# Patient Record
Sex: MALE | Race: WHITE | NOT HISPANIC OR LATINO | Employment: FULL TIME | URBAN - METROPOLITAN AREA
[De-identification: names, ages, dates, MRNs, and addresses within clinical notes are randomized per-mention and may not be internally consistent; named-entity substitution may affect disease eponyms.]

---

## 2018-06-29 ENCOUNTER — HOSPITAL ENCOUNTER (OUTPATIENT)
Facility: HOSPITAL | Age: 23
Discharge: HOME | End: 2018-06-30
Attending: FAMILY MEDICINE | Admitting: INTERNAL MEDICINE
Payer: COMMERCIAL

## 2018-06-29 DIAGNOSIS — K52.9 COLITIS: Primary | ICD-10-CM

## 2018-06-29 LAB
ALBUMIN SERPL BCP-MCNC: 4.8 G/DL
ALP SERPL-CCNC: 61 U/L
ALT SERPL W/O P-5'-P-CCNC: 15 U/L
ANION GAP SERPL CALC-SCNC: 12 MMOL/L
AST SERPL-CCNC: 18 U/L
BASOPHILS # BLD AUTO: 0.03 K/UL
BASOPHILS NFR BLD: 0.2 %
BILIRUB SERPL-MCNC: 1.3 MG/DL
BUN SERPL-MCNC: 14 MG/DL
CALCIUM SERPL-MCNC: 9.7 MG/DL
CHLORIDE SERPL-SCNC: 102 MMOL/L
CO2 SERPL-SCNC: 23 MMOL/L
CREAT SERPL-MCNC: 0.9 MG/DL
DIFFERENTIAL METHOD: ABNORMAL
EOSINOPHIL # BLD AUTO: 0.1 K/UL
EOSINOPHIL NFR BLD: 0.6 %
ERYTHROCYTE [DISTWIDTH] IN BLOOD BY AUTOMATED COUNT: 12.3 %
EST. GFR  (AFRICAN AMERICAN): >60 ML/MIN/1.73 M^2
EST. GFR  (NON AFRICAN AMERICAN): >60 ML/MIN/1.73 M^2
GLUCOSE SERPL-MCNC: 91 MG/DL
HCT VFR BLD AUTO: 46 %
HGB BLD-MCNC: 16.1 G/DL
IMM GRANULOCYTES # BLD AUTO: 0.04 K/UL
IMM GRANULOCYTES NFR BLD AUTO: 0.3 %
LIPASE SERPL-CCNC: 32 U/L
LYMPHOCYTES # BLD AUTO: 0.7 K/UL
LYMPHOCYTES NFR BLD: 5.1 %
MCH RBC QN AUTO: 29.8 PG
MCHC RBC AUTO-ENTMCNC: 35 G/DL
MCV RBC AUTO: 85 FL
MONOCYTES # BLD AUTO: 0.7 K/UL
MONOCYTES NFR BLD: 5 %
NEUTROPHILS # BLD AUTO: 12.4 K/UL
NEUTROPHILS NFR BLD: 88.8 %
NRBC BLD-RTO: 0 /100 WBC
PLATELET # BLD AUTO: 181 K/UL
PMV BLD AUTO: 12.3 FL
POTASSIUM SERPL-SCNC: 3.4 MMOL/L
PROT SERPL-MCNC: 7.7 G/DL
RBC # BLD AUTO: 5.4 M/UL
SODIUM SERPL-SCNC: 137 MMOL/L
WBC # BLD AUTO: 13.92 K/UL

## 2018-06-29 PROCEDURE — 96361 HYDRATE IV INFUSION ADD-ON: CPT

## 2018-06-29 PROCEDURE — 25000003 PHARM REV CODE 250: Performed by: FAMILY MEDICINE

## 2018-06-29 PROCEDURE — S0030 INJECTION, METRONIDAZOLE: HCPCS | Performed by: FAMILY MEDICINE

## 2018-06-29 PROCEDURE — G0378 HOSPITAL OBSERVATION PER HR: HCPCS

## 2018-06-29 PROCEDURE — 85025 COMPLETE CBC W/AUTO DIFF WBC: CPT

## 2018-06-29 PROCEDURE — 80053 COMPREHEN METABOLIC PANEL: CPT

## 2018-06-29 PROCEDURE — S5010 5% DEXTROSE AND 0.45% SALINE: HCPCS | Performed by: FAMILY MEDICINE

## 2018-06-29 PROCEDURE — 83690 ASSAY OF LIPASE: CPT

## 2018-06-29 PROCEDURE — 99285 EMERGENCY DEPT VISIT HI MDM: CPT | Mod: 25

## 2018-06-29 PROCEDURE — 25500020 PHARM REV CODE 255: Performed by: FAMILY MEDICINE

## 2018-06-29 PROCEDURE — 74177 CT ABD & PELVIS W/CONTRAST: CPT | Mod: TC

## 2018-06-29 PROCEDURE — 74177 CT ABD & PELVIS W/CONTRAST: CPT | Mod: 26,,, | Performed by: RADIOLOGY

## 2018-06-29 PROCEDURE — 96374 THER/PROPH/DIAG INJ IV PUSH: CPT

## 2018-06-29 RX ORDER — SODIUM CHLORIDE 9 MG/ML
1000 INJECTION, SOLUTION INTRAVENOUS
Status: COMPLETED | OUTPATIENT
Start: 2018-06-29 | End: 2018-06-29

## 2018-06-29 RX ORDER — ONDANSETRON 2 MG/ML
4 INJECTION INTRAMUSCULAR; INTRAVENOUS EVERY 8 HOURS PRN
Status: DISCONTINUED | OUTPATIENT
Start: 2018-06-29 | End: 2018-06-30 | Stop reason: HOSPADM

## 2018-06-29 RX ORDER — METRONIDAZOLE 500 MG/100ML
500 INJECTION, SOLUTION INTRAVENOUS
Status: DISCONTINUED | OUTPATIENT
Start: 2018-06-29 | End: 2018-06-30 | Stop reason: HOSPADM

## 2018-06-29 RX ORDER — DEXTROSE MONOHYDRATE AND SODIUM CHLORIDE 5; .45 G/100ML; G/100ML
INJECTION, SOLUTION INTRAVENOUS CONTINUOUS
Status: DISCONTINUED | OUTPATIENT
Start: 2018-06-29 | End: 2018-06-30 | Stop reason: HOSPADM

## 2018-06-29 RX ORDER — ALPRAZOLAM 0.25 MG/1
0.25 TABLET ORAL
Status: COMPLETED | OUTPATIENT
Start: 2018-06-29 | End: 2018-06-29

## 2018-06-29 RX ORDER — MORPHINE SULFATE 4 MG/ML
4 INJECTION, SOLUTION INTRAMUSCULAR; INTRAVENOUS EVERY 4 HOURS PRN
Status: DISCONTINUED | OUTPATIENT
Start: 2018-06-29 | End: 2018-06-30 | Stop reason: HOSPADM

## 2018-06-29 RX ORDER — CIPROFLOXACIN 2 MG/ML
400 INJECTION, SOLUTION INTRAVENOUS
Status: DISCONTINUED | OUTPATIENT
Start: 2018-06-29 | End: 2018-06-30 | Stop reason: HOSPADM

## 2018-06-29 RX ORDER — ACETAMINOPHEN 325 MG/1
1000 TABLET ORAL
Status: COMPLETED | OUTPATIENT
Start: 2018-06-29 | End: 2018-06-29

## 2018-06-29 RX ADMIN — IOHEXOL 75 ML: 350 INJECTION, SOLUTION INTRAVENOUS at 07:06

## 2018-06-29 RX ADMIN — METRONIDAZOLE 500 MG: 500 INJECTION, SOLUTION INTRAVENOUS at 11:06

## 2018-06-29 RX ADMIN — SODIUM CHLORIDE 1000 ML: 9 INJECTION, SOLUTION INTRAVENOUS at 05:06

## 2018-06-29 RX ADMIN — ALPRAZOLAM 0.25 MG: 0.25 TABLET ORAL at 07:06

## 2018-06-29 RX ADMIN — DEXTROSE AND SODIUM CHLORIDE: 5; 450 INJECTION, SOLUTION INTRAVENOUS at 11:06

## 2018-06-29 RX ADMIN — ACETAMINOPHEN 975 MG: 325 TABLET, FILM COATED ORAL at 05:06

## 2018-06-29 NOTE — ED PROVIDER NOTES
Encounter Date: 6/29/2018       History   No chief complaint on file.    Patient to ER for abdominal pain, generalized, nausea. Reports has had off/on abdominal pains for 3 years, worsening in 3 days. Had a bloody bowel movement day before yesterday after abdominal pains/cramping.           Review of patient's allergies indicates:  Allergies not on file  No past medical history on file.  No past surgical history on file.  No family history on file.  Social History   Substance Use Topics    Smoking status: Not on file    Smokeless tobacco: Not on file    Alcohol use Not on file     Review of Systems   Constitutional: Negative.  Negative for fever.   HENT: Negative.  Negative for sore throat.    Respiratory: Negative.  Negative for shortness of breath.    Cardiovascular: Negative.  Negative for chest pain.   Gastrointestinal: Positive for abdominal pain and nausea.   Genitourinary: Negative for dysuria.   Musculoskeletal: Negative.  Negative for back pain.   Skin: Negative.  Negative for rash.   Neurological: Negative for weakness.   Hematological: Does not bruise/bleed easily.   All other systems reviewed and are negative.      Physical Exam     Initial Vitals   BP Pulse Resp Temp SpO2   -- -- -- -- --      MAP       --         Physical Exam    Nursing note and vitals reviewed.  Constitutional: He appears well-developed and well-nourished.   Eyes: Pupils are equal, round, and reactive to light.   Abdominal: Soft. Bowel sounds are normal. He exhibits no distension. There is tenderness (epigastric).   Neurological: He is alert and oriented to person, place, and time. He has normal strength.   Psychiatric: He has a normal mood and affect. His behavior is normal. Judgment and thought content normal.         ED Course   Procedures  Labs Reviewed - No data to display       Imaging Results    None         I have discussed case with incoming ED physician Dr. Kramer who will finish care and disposition of patient.       I  have viewed a 5 digital phone picture of the BM the patient had does appear to be a large amount of dark red blood with some small amount of stool            Labs Reviewed   COMPREHENSIVE METABOLIC PANEL - Abnormal; Notable for the following:        Result Value    Potassium 3.4 (*)     Total Bilirubin 1.3 (*)     All other components within normal limits   CBC W/ AUTO DIFFERENTIAL - Abnormal; Notable for the following:     WBC 13.92 (*)     Gran # (ANC) 12.4 (*)     Lymph # 0.7 (*)     Gran% 88.8 (*)     Lymph% 5.1 (*)     All other components within normal limits   LIPASE        ED Course as of Jun 29 2120 Fri Jun 29, 2018 2112 Subtle mural thickening in the descending colon, may reflect colitis, given history of Crohn's disease.  No bowel obstruction is seen. Consider ileus.  [MD]   2114 Spoke with Dr. Young, will admit for IV abx   [MD]      ED Course User Index  [MD] Karly Ellis MD     Clinical Impression:   The encounter diagnosis was Colitis.                             Karly Ellis MD  06/29/18 2120

## 2018-06-30 VITALS
TEMPERATURE: 96 F | RESPIRATION RATE: 16 BRPM | HEART RATE: 71 BPM | SYSTOLIC BLOOD PRESSURE: 108 MMHG | HEIGHT: 61 IN | BODY MASS INDEX: 26.43 KG/M2 | WEIGHT: 140 LBS | DIASTOLIC BLOOD PRESSURE: 56 MMHG | OXYGEN SATURATION: 100 %

## 2018-06-30 LAB
ANION GAP SERPL CALC-SCNC: 5 MMOL/L
BASOPHILS # BLD AUTO: 0.03 K/UL
BASOPHILS NFR BLD: 0.4 %
BUN SERPL-MCNC: 9 MG/DL
CALCIUM SERPL-MCNC: 9.2 MG/DL
CHLORIDE SERPL-SCNC: 104 MMOL/L
CO2 SERPL-SCNC: 27 MMOL/L
CREAT SERPL-MCNC: 0.9 MG/DL
DIFFERENTIAL METHOD: ABNORMAL
EOSINOPHIL # BLD AUTO: 0.1 K/UL
EOSINOPHIL NFR BLD: 1.6 %
ERYTHROCYTE [DISTWIDTH] IN BLOOD BY AUTOMATED COUNT: 12.5 %
EST. GFR  (AFRICAN AMERICAN): >60 ML/MIN/1.73 M^2
EST. GFR  (NON AFRICAN AMERICAN): >60 ML/MIN/1.73 M^2
GLUCOSE SERPL-MCNC: 110 MG/DL
HCT VFR BLD AUTO: 42.7 %
HGB BLD-MCNC: 15.3 G/DL
IMM GRANULOCYTES # BLD AUTO: 0.01 K/UL
IMM GRANULOCYTES NFR BLD AUTO: 0.1 %
LYMPHOCYTES # BLD AUTO: 0.9 K/UL
LYMPHOCYTES NFR BLD: 11.3 %
MCH RBC QN AUTO: 30.2 PG
MCHC RBC AUTO-ENTMCNC: 35.8 G/DL
MCV RBC AUTO: 84 FL
MONOCYTES # BLD AUTO: 0.7 K/UL
MONOCYTES NFR BLD: 8.1 %
NEUTROPHILS # BLD AUTO: 6.5 K/UL
NEUTROPHILS NFR BLD: 78.5 %
NRBC BLD-RTO: 0 /100 WBC
PLATELET # BLD AUTO: 184 K/UL
PMV BLD AUTO: 12.2 FL
POTASSIUM SERPL-SCNC: 3.5 MMOL/L
RBC # BLD AUTO: 5.07 M/UL
SODIUM SERPL-SCNC: 136 MMOL/L
WBC # BLD AUTO: 8.3 K/UL

## 2018-06-30 PROCEDURE — 96375 TX/PRO/DX INJ NEW DRUG ADDON: CPT

## 2018-06-30 PROCEDURE — G0378 HOSPITAL OBSERVATION PER HR: HCPCS

## 2018-06-30 PROCEDURE — 80048 BASIC METABOLIC PNL TOTAL CA: CPT

## 2018-06-30 PROCEDURE — 25000003 PHARM REV CODE 250: Performed by: FAMILY MEDICINE

## 2018-06-30 PROCEDURE — 85025 COMPLETE CBC W/AUTO DIFF WBC: CPT

## 2018-06-30 PROCEDURE — 36415 COLL VENOUS BLD VENIPUNCTURE: CPT

## 2018-06-30 PROCEDURE — 63600175 PHARM REV CODE 636 W HCPCS: Performed by: FAMILY MEDICINE

## 2018-06-30 PROCEDURE — 96361 HYDRATE IV INFUSION ADD-ON: CPT

## 2018-06-30 PROCEDURE — S0030 INJECTION, METRONIDAZOLE: HCPCS | Performed by: FAMILY MEDICINE

## 2018-06-30 PROCEDURE — 96376 TX/PRO/DX INJ SAME DRUG ADON: CPT

## 2018-06-30 RX ORDER — CIPROFLOXACIN 500 MG/1
500 TABLET ORAL EVERY 12 HOURS
Qty: 14 TABLET | Refills: 0 | Status: SHIPPED | OUTPATIENT
Start: 2018-06-30 | End: 2020-02-29 | Stop reason: CLARIF

## 2018-06-30 RX ORDER — METRONIDAZOLE 250 MG/1
500 TABLET ORAL EVERY 8 HOURS
Qty: 21 TABLET | Refills: 0 | Status: SHIPPED | OUTPATIENT
Start: 2018-06-30 | End: 2019-03-14

## 2018-06-30 RX ADMIN — METRONIDAZOLE 500 MG: 500 INJECTION, SOLUTION INTRAVENOUS at 05:06

## 2018-06-30 RX ADMIN — CIPROFLOXACIN 400 MG: 2 INJECTION, SOLUTION INTRAVENOUS at 12:06

## 2018-06-30 NOTE — SUBJECTIVE & OBJECTIVE
Past Medical History:   Diagnosis Date    Anxiety        Past Surgical History:   Procedure Laterality Date    TONSILLECTOMY         Review of patient's allergies indicates:  No Known Allergies    No current facility-administered medications on file prior to encounter.      No current outpatient prescriptions on file prior to encounter.     Family History     None        Social History Main Topics    Smoking status: Never Smoker    Smokeless tobacco: Not on file    Alcohol use 4.2 oz/week     7 Cans of beer per week    Drug use: Unknown    Sexual activity: Not on file     Review of Systems   Constitutional: Positive for appetite change. Negative for activity change, chills, diaphoresis, fatigue, fever and unexpected weight change.   HENT: Negative for congestion, drooling, hearing loss and trouble swallowing.    Eyes: Negative for pain and visual disturbance.   Respiratory: Negative.  Negative for apnea, cough, choking, chest tightness, shortness of breath and wheezing.    Cardiovascular: Negative for chest pain, palpitations and leg swelling.   Gastrointestinal: Positive for abdominal pain, blood in stool and nausea. Negative for abdominal distention, constipation, diarrhea and vomiting.   Endocrine: Negative for polydipsia, polyphagia and polyuria.   Genitourinary: Negative for difficulty urinating, dysuria and flank pain.   Musculoskeletal: Negative for arthralgias, back pain, gait problem, joint swelling, neck pain and neck stiffness.   Skin: Negative for color change, rash and wound.   Allergic/Immunologic: Negative for environmental allergies, food allergies and immunocompromised state.   Neurological: Negative for dizziness, syncope, weakness, numbness and headaches.   Hematological: Negative for adenopathy.   Psychiatric/Behavioral: Negative for agitation, confusion and sleep disturbance. The patient is not nervous/anxious.      Objective:     Vital Signs (Most Recent):  Temp: 96.1 °F (35.6 °C)  (06/30/18 0715)  Pulse: 71 (06/30/18 0715)  Resp: 16 (06/30/18 0715)  BP: (!) 108/56 (06/30/18 0715)  SpO2: 100 % (06/30/18 0715) Vital Signs (24h Range):  Temp:  [96.1 °F (35.6 °C)-101.3 °F (38.5 °C)] 96.1 °F (35.6 °C)  Pulse:  [70-94] 71  Resp:  [16-18] 16  SpO2:  [97 %-100 %] 100 %  BP: (100-131)/(46-82) 108/56     Weight: 63.5 kg (139 lb 15.9 oz)  Body mass index is 26.45 kg/m².    Physical Exam   Constitutional: He is oriented to person, place, and time. He appears well-developed and well-nourished.   HENT:   Head: Normocephalic and atraumatic.   Right Ear: External ear normal.   Left Ear: External ear normal.   Nose: Nose normal.   Eyes: Conjunctivae, EOM and lids are normal. Pupils are equal, round, and reactive to light.   Neck: Trachea normal, normal range of motion and full passive range of motion without pain. Neck supple.   Cardiovascular: Normal rate, regular rhythm, S1 normal, S2 normal, normal heart sounds, intact distal pulses and normal pulses.    Pulmonary/Chest: Effort normal and breath sounds normal.   Abdominal: Soft. Normal aorta and bowel sounds are normal. There is tenderness.   Musculoskeletal: Normal range of motion.   Neurological: He is alert and oriented to person, place, and time. He has normal reflexes.   Skin: Skin is warm, dry and intact.   Psychiatric: He has a normal mood and affect. His speech is normal and behavior is normal. Judgment and thought content normal. Cognition and memory are normal.   Nursing note and vitals reviewed.        CRANIAL NERVES     CN III, IV, VI   Pupils are equal, round, and reactive to light.  Extraocular motions are normal.        Significant Labs:   CBC:   Recent Labs  Lab 06/29/18  1725 06/30/18  0440   WBC 13.92* 8.30   HGB 16.1 15.3   HCT 46.0 42.7    184     CMP:   Recent Labs  Lab 06/29/18  1725 06/30/18  0440    136   K 3.4* 3.5    104   CO2 23 27   GLU 91 110   BUN 14 9   CREATININE 0.9 0.9   CALCIUM 9.7 9.2   PROT 7.7  --     ALBUMIN 4.8  --    BILITOT 1.3*  --    ALKPHOS 61  --    AST 18  --    ALT 15  --    ANIONGAP 12 5*   EGFRNONAA >60.0 >60.0     All pertinent labs within the past 24 hours have been reviewed.    Significant Imaging: CT: I have reviewed all pertinent results/findings within the past 24 hours and my personal findings are:  as dictated  I have reviewed and interpreted all pertinent imaging results/findings within the past 24 hours.

## 2018-06-30 NOTE — H&P
[facility]  History & Physical    Patient Name: Toni Hernandez  MRN: [id number]  Admission Date: 6/29/2018  Attending Physician: Andry Young MD   Primary Care Provider: Provider [employer]         Patient information was obtained from patient and ER records.     Subjective:     Principal Problem:<principal problem not specified>    Chief Complaint:   Chief Complaint   Patient presents with    Abdominal Pain    Rectal Bleeding        HPI: abd pain nausea 3 days bloody bms    Past Medical History:   Diagnosis Date    Anxiety        Past Surgical History:   Procedure Laterality Date    TONSILLECTOMY         Review of patient's allergies indicates:  No Known Allergies    No current facility-administered medications on file prior to encounter.      No current outpatient prescriptions on file prior to encounter.     Family History     None        Social History Main Topics    Smoking status: Never Smoker    Smokeless tobacco: Not on file    Alcohol use 4.2 oz/week     7 Cans of beer per week    Drug use: Unknown    Sexual activity: Not on file     Review of Systems   Constitutional: Positive for appetite change. Negative for activity change, chills, diaphoresis, fatigue, fever and unexpected weight change.   HENT: Negative for congestion, drooling, hearing loss and trouble swallowing.    Eyes: Negative for pain and visual disturbance.   Respiratory: Negative.  Negative for apnea, cough, choking, chest tightness, shortness of breath and wheezing.    Cardiovascular: Negative for chest pain, palpitations and leg swelling.   Gastrointestinal: Positive for abdominal pain, blood in stool and nausea. Negative for abdominal distention, constipation, diarrhea and vomiting.   Endocrine: Negative for polydipsia, polyphagia and polyuria.   Genitourinary: Negative for difficulty urinating, dysuria and flank pain.   Musculoskeletal: Negative for arthralgias,  back pain, gait problem, joint swelling, neck pain and neck stiffness.   Skin: Negative for color change, rash and wound.   Allergic/Immunologic: Negative for environmental allergies, food allergies and immunocompromised state.   Neurological: Negative for dizziness, syncope, weakness, numbness and headaches.   Hematological: Negative for adenopathy.   Psychiatric/Behavioral: Negative for agitation, confusion and sleep disturbance. The patient is not nervous/anxious.      Objective:     Vital Signs (Most Recent):  Temp: 96.1 °F (35.6 °C) (06/30/18 0715)  Pulse: 71 (06/30/18 0715)  Resp: 16 (06/30/18 0715)  BP: (!) 108/56 (06/30/18 0715)  SpO2: 100 % (06/30/18 0715) Vital Signs (24h Range):  Temp:  [96.1 °F (35.6 °C)-101.3 °F (38.5 °C)] 96.1 °F (35.6 °C)  Pulse:  [70-94] 71  Resp:  [16-18] 16  SpO2:  [97 %-100 %] 100 %  BP: (100-131)/(46-82) 108/56     Weight: 63.5 kg (139 lb 15.9 oz)  Body mass index is 26.45 kg/m².    Physical Exam   Constitutional: He is oriented to person, place, and time. He appears well-developed and well-nourished.   HENT:   Head: Normocephalic and atraumatic.   Right Ear: External ear normal.   Left Ear: External ear normal.   Nose: Nose normal.   Eyes: Conjunctivae, EOM and lids are normal. Pupils are equal, round, and reactive to light.   Neck: Trachea normal, normal range of motion and full passive range of motion without pain. Neck supple.   Cardiovascular: Normal rate, regular rhythm, S1 normal, S2 normal, normal heart sounds, intact distal pulses and normal pulses.    Pulmonary/Chest: Effort normal and breath sounds normal.   Abdominal: Soft. Normal aorta and bowel sounds are normal. There is tenderness.   Musculoskeletal: Normal range of motion.   Neurological: He is alert and oriented to person, place, and time. He has normal reflexes.   Skin: Skin is warm, dry and intact.   Psychiatric: He has a normal mood and affect. His speech is normal and behavior is normal. Judgment and thought  content normal. Cognition and memory are normal.   Nursing note and vitals reviewed.        CRANIAL NERVES     CN III, IV, VI   Pupils are equal, round, and reactive to light.  Extraocular motions are normal.        Significant Labs:   CBC:   Recent Labs  Lab 06/29/18  1725 06/30/18  0440   WBC 13.92* 8.30   HGB 16.1 15.3   HCT 46.0 42.7    184     CMP:   Recent Labs  Lab 06/29/18  1725 06/30/18  0440    136   K 3.4* 3.5    104   CO2 23 27   GLU 91 110   BUN 14 9   CREATININE 0.9 0.9   CALCIUM 9.7 9.2   PROT 7.7  --    ALBUMIN 4.8  --    BILITOT 1.3*  --    ALKPHOS 61  --    AST 18  --    ALT 15  --    ANIONGAP 12 5*   EGFRNONAA >60.0 >60.0     All pertinent labs within the past 24 hours have been reviewed.    Significant Imaging: CT: I have reviewed all pertinent results/findings within the past 24 hours and my personal findings are:  as dictated  I have reviewed and interpreted all pertinent imaging results/findings within the past 24 hours.    Assessment/Plan:     Colitis    clolitis abd pain ivfs antibiotics pain meds            VTE Risk Mitigation         Ordered     IP VTE LOW RISK PATIENT  Once      06/29/18 1971         Patient first seen 6/30      Andry Young MD  Department of Hospital Medicine   [facility] - Med Surg

## 2018-06-30 NOTE — PLAN OF CARE
Problem: Pain, Acute (Adult)  Goal: Identify Related Risk Factors and Signs and Symptoms  Related risk factors and signs and symptoms are identified upon initiation of Human Response Clinical Practice Guideline (CPG)   06/29/18 2247   Pain, Acute   Related Risk Factors (Acute Pain) patient perception;positioning;psychosocial factor

## 2018-06-30 NOTE — NURSING
PT D/C TO HOME PER MD ORDER, IV SITE REMOVED WITH CATH INTACT, GAUZE AND TAPE APPLIED, PT HAROLDO WELL. D/C INSTRUCTIONS REVIEWED, VERB UNDERSTOOD. PT REFUSED W/C TO AMBULATE OUT TO POV. SSRN

## 2018-06-30 NOTE — NURSING
Called ER and attempted to receive report from ER nurse but he was not available. Message left with Jaci ANGUIANO to have nurse return call when available.

## 2018-06-30 NOTE — PLAN OF CARE
Problem: Health Knowledge, Opportunity to Enhance (Adult,NICU,[place],Obstetrics,Pediatric)  Goal: Knowledgeable about Health Subject/Topic  Patient will demonstrate the desired outcomes by discharge/transition of care.  Outcome: Ongoing (interventions implemented as appropriate)   18 0684   Health Knowledge, Opportunity to Enhance (Adult,NICU,[place],Obstetrics,Pediatric)   Knowledgeable about Health Subject/Topic making progress toward outcome

## 2018-06-30 NOTE — PLAN OF CARE
Problem: Pain, Acute (Adult)  Goal: Acceptable Pain Control/Comfort Level  Patient will demonstrate the desired outcomes by discharge/transition of care.  Outcome: Ongoing (interventions implemented as appropriate)   06/29/18 6850   Pain, Acute (Adult)   Acceptable Pain Control/Comfort Level making progress toward outcome

## 2018-06-30 NOTE — HOSPITAL COURSE
IVFs antibiotics will need gi w/u as outpt  6/30 wants to go home feel much better f/u with DR REYES for c-scope with grandmother Chron's

## 2018-06-30 NOTE — DISCHARGE SUMMARY
[facility]  Hospital Medicine  Discharge Summary      Patient Name: Toni Hernandez  MRN: [id number]  Admission Date: 6/29/2018  Hospital Length of Stay: 0 days  Discharge Date and Time:  06/30/2018 9:25 AM  Attending Physician: Andry Young MD   Discharging Provider: Andry Young MD  Primary Care Provider: Provider [employer]      HPI:   abd pain nausea 3 days bloody bms    * No surgery found *      Hospital Course:   IVFs antibiotics will need gi w/u as outpt  6/30 wants to go home feel much better f/u with DR REYES for c-scope with grandmother Chron's      Consults:     No new Assessment & Plan notes have been filed under this hospital service since the last note was generated.  Service: Hospital Medicine    Final Active Diagnoses:    Diagnosis Date Noted POA    PRINCIPAL PROBLEM:  Colitis [K52.9] 06/29/2018 Yes      Problems Resolved During this Admission:    Diagnosis Date Noted Date Resolved POA       Discharged Condition: good    Disposition: Home or Self Care    Follow Up:  Follow-up Information     Yakov Greene MD.    Specialty:  General Surgery  Contact information:  [address] [place] [id number] [phone]                 Patient Instructions:   No discharge procedures on file.    Significant Diagnostic Studies: Labs:   BMP:   Recent Labs  Lab 06/29/18  1725 06/30/18  0440   GLU 91 110    136   K 3.4* 3.5    104   CO2 23 27   BUN 14 9   CREATININE 0.9 0.9   CALCIUM 9.7 9.2   , CMP   Recent Labs  Lab 06/29/18  1725 06/30/18  0440    136   K 3.4* 3.5    104   CO2 23 27   GLU 91 110   BUN 14 9   CREATININE 0.9 0.9   CALCIUM 9.7 9.2   PROT 7.7  --    ALBUMIN 4.8  --    BILITOT 1.3*  --    ALKPHOS 61  --    AST 18  --    ALT 15  --    ANIONGAP 12 5*   ESTGFRAFRICA >60.0 >60.0   EGFRNONAA >60.0 >60.0   , CBC   Recent Labs  Lab 06/29/18  1725 06/30/18  0440   WBC 13.92* 8.30   HGB 16.1 15.3   HCT 46.0 42.7   PLT  181 184    and All labs within the past 24 hours have been reviewed    Pending Diagnostic Studies:     Procedure Component Value Units Date/Time    CT Abdomen Pelvis With Contrast [[id number]] Resulted:  06/29/18 1932    Order Status:  Sent Lab Status:  In process Updated:  06/29/18 1958         Medications:  Reconciled Home Medications:      Medication List      START taking these medications    ciprofloxacin HCl 500 MG tablet  Commonly known as:  CIPRO  Take 1 tablet (500 mg total) by mouth every 12 (twelve) hours.     metroNIDAZOLE 250 MG tablet  Commonly known as:  FLAGYL  Take 2 tablets (500 mg total) by mouth every 8 (eight) hours.            Indwelling Lines/Drains at time of discharge:   Lines/Drains/Airways          No matching active lines, drains, or airways          Time spent on the discharge of patient: 35 minutes  Patient was seen and examined on the date of discharge and determined to be suitable for discharge.         Andry Young MD  Department of Hospital Medicine  [facility] - Med Surg

## 2018-07-03 NOTE — PLAN OF CARE
CM called patient to advise of Dr Greene follow up appt  On 6/12/18 0800. Left voice mail message requesting return call

## 2018-09-24 NOTE — HISTORY AND PHYSICAL ADDENDUM
In the history and physical, there is a question about location and  laterality of abdominal pain.  The patient came in with colitis.  He had  generalized abdominal pain.  There was no distinct location.  He was tender  over the generalized abdomen, particularly over the area of the colon from  right to left.        NAISM/KORI dd: 09/23/2018 06:43:24 (CDT)   td: 09/23/2018 09:57:51 (CDT)  Doc ID #[id number]   Job ID #[id number]    CC:

## 2019-03-14 ENCOUNTER — HOSPITAL ENCOUNTER (EMERGENCY)
Facility: HOSPITAL | Age: 24
Discharge: HOME | End: 2019-03-14
Attending: EMERGENCY MEDICINE
Payer: COMMERCIAL

## 2019-03-14 VITALS
DIASTOLIC BLOOD PRESSURE: 80 MMHG | TEMPERATURE: 99 F | SYSTOLIC BLOOD PRESSURE: 138 MMHG | HEART RATE: 71 BPM | HEIGHT: 69 IN | OXYGEN SATURATION: 99 % | RESPIRATION RATE: 16 BRPM | BODY MASS INDEX: 20.14 KG/M2 | WEIGHT: 136 LBS

## 2019-03-14 DIAGNOSIS — T14.90XA TRAUMA: ICD-10-CM

## 2019-03-14 DIAGNOSIS — M79.672 ACUTE PAIN OF LEFT FOOT: Primary | ICD-10-CM

## 2019-03-14 PROCEDURE — 73630 XR FOOT COMPLETE 3 VIEW LEFT: ICD-10-PCS | Mod: 26,LT,, | Performed by: RADIOLOGY

## 2019-03-14 PROCEDURE — 73630 X-RAY EXAM OF FOOT: CPT | Mod: TC,FY,LT

## 2019-03-14 PROCEDURE — 99283 EMERGENCY DEPT VISIT LOW MDM: CPT

## 2019-03-14 PROCEDURE — 73630 X-RAY EXAM OF FOOT: CPT | Mod: 26,LT,, | Performed by: RADIOLOGY

## 2019-03-15 NOTE — DISCHARGE INSTRUCTIONS
Rest, ice and elevate  Alternate Tylenol or Ibuprofen as directed for pain  Follow up with Ortho  Return to Emergency Department for any worsening symptoms

## 2019-03-15 NOTE — ED PROVIDER NOTES
Encounter Date: 3/14/2019       History     Chief Complaint   Patient presents with    Foot Pain     L FOOT PAIN AFTER RAN OVER WITH EQUIPMENT     Pt c/o of left foot pain.  Pt states a heavy object fell on his left foot earlier today.          Review of patient's allergies indicates:   Allergen Reactions    Sulfa (sulfonamide antibiotics)      Past Medical History:   Diagnosis Date    Anxiety      Past Surgical History:   Procedure Laterality Date    TONSILLECTOMY       No family history on file.  Social History     Tobacco Use    Smoking status: Never Smoker    Smokeless tobacco: Never Used   Substance Use Topics    Alcohol use: Yes     Alcohol/week: 4.2 oz     Types: 7 Cans of beer per week     Comment: 3-4 DAYS WEEKLY    Drug use: No     Review of Systems   Musculoskeletal:        Left foot pain   All other systems reviewed and are negative.      Physical Exam     Initial Vitals [03/14/19 2007]   BP Pulse Resp Temp SpO2   138/80 71 16 98.9 °F (37.2 °C) 99 %      MAP       --         Physical Exam    Nursing note and vitals reviewed.  Constitutional: He appears well-developed and well-nourished.   HENT:   Head: Normocephalic.   Nose: Nose normal.   Eyes: Conjunctivae and EOM are normal. Pupils are equal, round, and reactive to light.   Neck: Normal range of motion. Neck supple.   Cardiovascular: Normal rate.   Pulmonary/Chest: Breath sounds normal.   Abdominal: Soft. Bowel sounds are normal.   Musculoskeletal:        Left ankle: He exhibits normal range of motion, no swelling, no ecchymosis and no deformity. Tenderness.   Neurological: He is alert and oriented to person, place, and time. He has normal strength and normal reflexes.   Skin: Skin is warm and dry. Capillary refill takes 2 to 3 seconds.   Psychiatric: He has a normal mood and affect. His behavior is normal. Judgment and thought content normal.         ED Course   Procedures  Labs Reviewed - No data to display       Imaging Results           X-Ray Foot Complete Left (In process)                                       Clinical Impression:       ICD-10-CM ICD-9-CM   1. Acute pain of left foot M79.672 729.5   2. Trauma T14.90XA 959.9                                Rosalind Hagen, [facility]  03/14/19 2054

## 2020-02-16 ENCOUNTER — HOSPITAL ENCOUNTER (EMERGENCY)
Facility: HOSPITAL | Age: 25
Discharge: HOME | End: 2020-02-16
Attending: EMERGENCY MEDICINE
Payer: COMMERCIAL

## 2020-02-16 VITALS
DIASTOLIC BLOOD PRESSURE: 58 MMHG | HEART RATE: 88 BPM | HEIGHT: 68 IN | SYSTOLIC BLOOD PRESSURE: 128 MMHG | WEIGHT: 160 LBS | TEMPERATURE: 99 F | RESPIRATION RATE: 16 BRPM | BODY MASS INDEX: 24.25 KG/M2 | OXYGEN SATURATION: 98 %

## 2020-02-16 DIAGNOSIS — M25.562 LEFT KNEE PAIN: ICD-10-CM

## 2020-02-16 DIAGNOSIS — S83.412A SPRAIN OF MEDIAL COLLATERAL LIGAMENT OF LEFT KNEE, INITIAL ENCOUNTER: Primary | ICD-10-CM

## 2020-02-16 PROCEDURE — 99283 EMERGENCY DEPT VISIT LOW MDM: CPT | Mod: 25

## 2020-02-16 RX ORDER — IBUPROFEN 200 MG
400 TABLET ORAL EVERY 6 HOURS PRN
Qty: 30 TABLET | Refills: 0
Start: 2020-02-16

## 2020-02-16 RX ORDER — ACETAMINOPHEN 500 MG
1000 TABLET ORAL 3 TIMES DAILY PRN
Qty: 30 TABLET | Refills: 0
Start: 2020-02-16

## 2020-02-16 NOTE — ED PROVIDER NOTES
Encounter Date: 2/16/2020       History     Chief Complaint   Patient presents with    Knee Pain     left knee pain since yesterday     24-year-old male with a slip and fall at home yesterday afternoon.  Patient stepped into a basket or item in his living room which slipped or slid on the wood floor causing him to fall.  Developed pain in his left knee.  Has a left knee effusion.  Is able to ambulate or bear weight however he does complain of pain with range of motion.        Review of patient's allergies indicates:   Allergen Reactions    Sulfa (sulfonamide antibiotics)      Past Medical History:   Diagnosis Date    Anxiety      Past Surgical History:   Procedure Laterality Date    TONSILLECTOMY       History reviewed. No pertinent family history.  Social History     Tobacco Use    Smoking status: Never Smoker    Smokeless tobacco: Never Used   Substance Use Topics    Alcohol use: Yes     Alcohol/week: 7.0 standard drinks     Types: 7 Cans of beer per week     Comment: 3-4 DAYS WEEKLY    Drug use: No     Review of Systems   Constitutional: Negative for fever.   HENT: Negative for sore throat.    Eyes: Negative for redness.   Respiratory: Negative for shortness of breath.    Cardiovascular: Negative for chest pain.   Gastrointestinal: Negative for nausea.   Genitourinary: Negative for dysuria.   Musculoskeletal: Positive for arthralgias, gait problem and joint swelling. Negative for back pain.   Skin: Negative for rash and wound.   Neurological: Negative for weakness.   Hematological: Does not bruise/bleed easily.   Psychiatric/Behavioral: Negative for behavioral problems. The patient is not nervous/anxious.    All other systems reviewed and are negative.      Physical Exam     Initial Vitals [02/16/20 1148]   BP Pulse Resp Temp SpO2   (!) 152/74 87 16 98.1 °F (36.7 °C) 99 %      MAP       --         Physical Exam    Nursing note and vitals reviewed.  Constitutional: He appears well-developed and  well-nourished.   HENT:   Head: Normocephalic and atraumatic.   Eyes: Conjunctivae, EOM and lids are normal.   Neck: Normal range of motion and full passive range of motion without pain.   Musculoskeletal:        Left knee: He exhibits decreased range of motion, swelling and effusion. He exhibits no deformity. Tenderness found. Medial joint line tenderness noted.   Neurological: He is alert.   Skin: Skin is warm, dry and intact.   Psychiatric: He has a normal mood and affect. His speech is normal and behavior is normal.         ED Course   Procedures  Labs Reviewed - No data to display       Imaging Results          X-Ray Knee Complete 4 or More Views Left (Preliminary result)  Result time 02/16/20 12:33:02    ED Interpretation by RADHA Crowe (02/16/20 12:33:02, [facility], Emergency Medicine)    Effusion, no acute osseous abnormality                               Medical Decision Making:   History:   I obtained history from: someone other than patient.       <> Summary of History: History was obtained from the patient and/or the patient's immediate family member present.  Initial Assessment:   NAD  Differential Diagnosis:   The patient's differential diagnoses includes but is not limited to closed fracture, sprain/strain, fall, musculoskeletal pain, contusion  Clinical Tests:   Radiological Study: Ordered                                 Clinical Impression:       ICD-10-CM ICD-9-CM   1. Sprain of medial collateral ligament of left knee, initial encounter S83.412A 844.1   2. Left knee pain M25.562 719.46                             RADHA Crowe  02/16/20 1233

## 2020-02-29 ENCOUNTER — HOSPITAL ENCOUNTER (EMERGENCY)
Facility: HOSPITAL | Age: 25
Discharge: HOME | End: 2020-02-29
Attending: EMERGENCY MEDICINE
Payer: COMMERCIAL

## 2020-02-29 VITALS
WEIGHT: 162 LBS | OXYGEN SATURATION: 99 % | HEIGHT: 68 IN | SYSTOLIC BLOOD PRESSURE: 124 MMHG | DIASTOLIC BLOOD PRESSURE: 93 MMHG | HEART RATE: 92 BPM | BODY MASS INDEX: 24.55 KG/M2 | RESPIRATION RATE: 20 BRPM | TEMPERATURE: 98 F

## 2020-02-29 DIAGNOSIS — M25.562 ACUTE PAIN OF LEFT KNEE: Primary | ICD-10-CM

## 2020-02-29 PROCEDURE — 99283 EMERGENCY DEPT VISIT LOW MDM: CPT

## 2020-02-29 RX ORDER — HYDROCODONE BITARTRATE AND ACETAMINOPHEN 5; 325 MG/1; MG/1
1 TABLET ORAL EVERY 8 HOURS PRN
Qty: 6 TABLET | Refills: 0 | Status: SHIPPED | OUTPATIENT
Start: 2020-02-29

## 2020-02-29 NOTE — ED TRIAGE NOTES
Patient injured his left knee on 2/16, seen in [place] on 2/16, has not followed up with Ortho, was told by [facility] he needed a MRI.

## 2020-03-01 NOTE — ED PROVIDER NOTES
CHIEF COMPLAINT  Chief Complaint   Patient presents with    Knee Pain     Patient injured his left knee on 2/16, seen in [place] on 2/16, has not followed up with Ortho, was told by [facility] he needed a MRI.       HPI  Toni Rossi a 24 y.o. male who presents to the ED with complaints of left knee pain.  slipped and fell, twisting knee.  was seen in ED at [place] and told he needs a MRI. Has not been able to have done.  originally got better but for past 2 days, has been unable to bear weight on knee      CURRENT MEDICATIONS  No current facility-administered medications on file prior to encounter.      Current Outpatient Medications on File Prior to Encounter   Medication Sig Dispense Refill    acetaminophen (TYLENOL) 500 MG tablet Take 2 tablets (1,000 mg total) by mouth 3 (three) times daily as needed for Pain. 30 tablet 0    ibuprofen (ADVIL,MOTRIN) 200 MG tablet Take 2 tablets (400 mg total) by mouth every 6 (six) hours as needed for Pain. 30 tablet 0    [DISCONTINUED] ciprofloxacin HCl (CIPRO) 500 MG tablet Take 1 tablet (500 mg total) by mouth every 12 (twelve) hours. 14 tablet 0       ALLERGIES  Review of patient's allergies indicates:   Allergen Reactions    Sulfa (sulfonamide antibiotics)          There is no immunization history on file for this patient.    PAST MEDICAL HISTORY  Past Medical History:   Diagnosis Date    Anxiety        SURGICAL HISTORY  Past Surgical History:   Procedure Laterality Date    TONSILLECTOMY         SOCIAL HISTORY  Social History     Socioeconomic History    Marital status:      Spouse name: Not on file    Number of children: Not on file    Years of education: Not on file    Highest education level: Not on file   Occupational History    Not on file   Social Needs    Financial resource strain: Not on file    Food insecurity:     Worry: Not on file     Inability: Not on file    Transportation needs:     Medical: Not on file     " Non-medical: Not on file   Tobacco Use    Smoking status: Never Smoker    Smokeless tobacco: Never Used   Substance and Sexual Activity    Alcohol use: Yes     Alcohol/week: 7.0 standard drinks     Types: 7 Cans of beer per week     Comment: 3-4 DAYS WEEKLY    Drug use: No    Sexual activity: Yes   Lifestyle    Physical activity:     Days per week: Not on file     Minutes per session: Not on file    Stress: Not on file   Relationships    Social connections:     Talks on phone: Not on file     Gets together: Not on file     Attends Gnosticist service: Not on file     Active member of club or organization: Not on file     Attends meetings of clubs or organizations: Not on file     Relationship status: Not on file   Other Topics Concern    Not on file   Social History Narrative    Not on file       FAMILY HISTORY  History reviewed. No pertinent family history.    REVIEW OF SYSTEMS  Constitutional: No fever, chills, or weakness.  Eyes: No redness, pain, or discharge  HENT: No ear pain, no headache, no rhinorrhea, no throat pain  Respiratory: No cough, wheezing or shortness of breath  Cardiovascular: No chest pain, palpitations or edema  GI: No abdominal pain, nausea, vomiting or diarrhea  Gu: No dysuria, no hematuria, or discharge  Musculoskeletal: + left knee pain, full range of motion. Good sensation  Skin: No rash or abrasion  Neurologic: No focal weakness or sensory changes.  All systems otherwise negative except as noted in the Review of Systems and History of Present Illness      PHYSICAL EXAM  Reviewed Triage Note  VITAL SIGNS:   Patient Vitals for the past 24 hrs:   BP Temp Temp src Pulse Resp SpO2 Height Weight   02/29/20 1753 (!) 124/93 98.3 °F (36.8 °C) Oral 92 20 99 % 5' 8" (1.727 m) 73.5 kg (162 lb)     Constitutional: Well developed, well nourished, Alert and oriented x3, No acute distress, non-toxic appearance.  Respiratory: Normal breath sounds, no respiratory distress, no wheezing, no rhonchi, " no rales  Cardiovascular: Normal heart rate, normal rhythm, no murmurs, no rubs, no gallops.  Musculoskeletal: Ambulatory with slight limp. Knee with slight swelling, Joint stable, no laxity, no redness or warmth  Integument: Warm, Dry, No erythema, no rash  Neurologic: Normal motor function, normal sensory function. No focal deficits noted. Intact distal pulses  Psychiatric: Affect normal, judgment normal, mood normal      LABS  Pertinent labs reviewed. (see chart for details)  Labs Reviewed - No data to display    RADIOLOGY  No orders to display         PROCEDURE  Procedures      ED COURSE & MEDICAL DECISION MAKING     MDM       Physical exam findings discussed with patient. No acute emergent medical condition identified at this time to warrant further testing. Will dispo home with instructions to follow up with PCP, return to the ED for worsening condition. Pt agrees with plan of care.     DISPOSITION  Patient discharged in stable condition 2/29/2020  6:37 PM      CLINICAL IMPRESSION:  The encounter diagnosis was Acute pain of left knee.    Patient advised to follow-up with your PCP within 3 days for BP re-check if Blood Pressure was >120/80 without history of hypertension.         Mariangel Fonseca, CYNTHIA  02/29/20 3262

## 2020-03-02 ENCOUNTER — TELEPHONE (OUTPATIENT)
Dept: ORTHOPEDICS | Facility: CLINIC | Age: 25
End: 2020-03-02

## 2022-03-27 ENCOUNTER — HOSPITAL ENCOUNTER (EMERGENCY)
Facility: HOSPITAL | Age: 27
Discharge: HOME | End: 2022-03-27
Attending: EMERGENCY MEDICINE
Payer: COMMERCIAL

## 2022-03-27 VITALS
HEIGHT: 70 IN | TEMPERATURE: 98 F | DIASTOLIC BLOOD PRESSURE: 86 MMHG | WEIGHT: 145 LBS | OXYGEN SATURATION: 98 % | RESPIRATION RATE: 20 BRPM | BODY MASS INDEX: 20.76 KG/M2 | HEART RATE: 100 BPM | SYSTOLIC BLOOD PRESSURE: 139 MMHG

## 2022-03-27 DIAGNOSIS — R19.01 ABDOMINAL MASS, RUQ (RIGHT UPPER QUADRANT): Primary | ICD-10-CM

## 2022-03-27 DIAGNOSIS — R10.11 RUQ PAIN: ICD-10-CM

## 2022-03-27 PROCEDURE — 74176 CT ABD & PELVIS W/O CONTRAST: CPT | Mod: 26,,, | Performed by: RADIOLOGY

## 2022-03-27 PROCEDURE — 74176 CT ABDOMEN PELVIS WITHOUT CONTRAST: ICD-10-PCS | Mod: 26,,, | Performed by: RADIOLOGY

## 2022-03-27 PROCEDURE — 74176 CT ABD & PELVIS W/O CONTRAST: CPT | Mod: TC

## 2022-03-27 PROCEDURE — 99284 EMERGENCY DEPT VISIT MOD MDM: CPT | Mod: 25

## 2022-03-27 NOTE — ED PROVIDER NOTES
Encounter Date: 3/27/2022       History     Chief Complaint   Patient presents with    Feels an abdominal mass in abdomen x 2-3 weeks      Patient has a friend that is a nurse and they told him to go to the ER      The history is provided by the patient.   General Illness   The current episode started several weeks ago (right upper quarant, feeling a mass). The problem occurs continuously. The problem has been unchanged. The pain is at a severity of 0/10. Nothing relieves the symptoms. Nothing aggravates the symptoms. Pertinent negatives include no fever, no abdominal pain, no constipation, no diarrhea, no nausea, no vomiting, no sore throat, no muscle aches and no neck pain.     Review of patient's allergies indicates:   Allergen Reactions    Sulfa (sulfonamide antibiotics)      Past Medical History:   Diagnosis Date    Anxiety      Past Surgical History:   Procedure Laterality Date    TONSILLECTOMY       History reviewed. No pertinent family history.  Social History     Tobacco Use    Smoking status: Never Smoker    Smokeless tobacco: Never Used   Substance Use Topics    Alcohol use: Yes     Alcohol/week: 7.0 standard drinks     Types: 7 Cans of beer per week     Comment: 3-4 DAYS WEEKLY    Drug use: No     Review of Systems   Constitutional: Negative for fever.   HENT: Negative for sore throat.    Gastrointestinal: Negative for abdominal pain, constipation, diarrhea, nausea and vomiting.   Musculoskeletal: Negative for neck pain.   All other systems reviewed and are negative.      Physical Exam     Initial Vitals [03/27/22 1304]   BP Pulse Resp Temp SpO2   139/86 100 20 98 °F (36.7 °C) 98 %      MAP       --         Physical Exam    Nursing note and vitals reviewed.  Constitutional: He appears well-developed.   HENT:   Head: Normocephalic and atraumatic.   Eyes: Pupils are equal, round, and reactive to light.   Neck: Neck supple.   Normal range of motion.  Cardiovascular: Normal rate.   Pulmonary/Chest:  Breath sounds normal.   Abdominal: Abdomen is soft. He exhibits no distension and no mass. There is abdominal tenderness in the right upper quadrant.   No right CVA tenderness.  No left CVA tenderness. There is no rebound, no guarding, no tenderness at McBurney's point and negative Bedolla's sign. negative obturator sign, negative psoas sign and negative Rovsing's sign  Musculoskeletal:         General: Normal range of motion.      Cervical back: Normal range of motion and neck supple.     Neurological: He is alert and oriented to person, place, and time.   Skin: Skin is warm and dry.   Psychiatric: He has a normal mood and affect.         ED Course   Procedures  Labs Reviewed - No data to display       Imaging Results          CT Abdomen Pelvis  Without Contrast (Final result)  Result time 03/27/22 14:57:43    Final result by Gene Fournier MD (03/27/22 14:57:43)                 Impression:      No acute findings in the abdomen.      Electronically signed by: Gene Fournier  Date:    03/27/2022  Time:    14:57             Narrative:    EXAMINATION:  CT ABDOMEN PELVIS WITHOUT CONTRAST    CLINICAL HISTORY:  Abdominal abscess/infection suspected;    TECHNIQUE:  Low dose axial images, sagittal and coronal reformations were obtained from the lung bases to the pubic symphysis.  Oral contrast was not administered.    COMPARISON:  06/29/2018    FINDINGS:  Lung bases are clear.  Normal size heart.  Nondistended gallbladder.    Solid abdominal organs including liver spleen pancreas adrenal glands and kidneys are grossly unremarkable on this noncontrast exam.    There is no enteric contrast which limits bowel assessment.  No dilated bowel loops.  Normal appendix.    No focal abdominal fluid collection.  Unremarkable noncontrast prostate and urinary bladder.    No acute osseous abnormality.  Limbus vertebra at L4 and L5.                                 Medications - No data to display  Medical Decision Making:    Differential Diagnosis:    diverticulitis, appendicitis, cholelithiasis, cholecystitis, gastritis, abdominal wall mass  ED Management:  No acute findings.  Please return for new, changing, or worsening pain. The patient was also instructed that follow up with a primary care physician is strongly recommended after any visit to the ED.  Patient expressed understanding and agreed with treatment plan and was discharged in stable condition.                         Clinical Impression:   Final diagnoses:  [R19.01] Abdominal mass, RUQ (right upper quadrant) (Primary)  [R10.11] RUQ pain          ED Disposition Condition    Discharge Stable        ED Prescriptions     None        Follow-up Information     Follow up With Specialties Details Why Contact Info    [facility] In 3 days If symptoms worsen [address] [facility] [address] [phone]      [facility] Emergency Dept Emergency Medicine  If symptoms worsen [address] [facility] [address] [phone]           Kelly Crowell NP  03/27/22 1523

## 2022-03-27 NOTE — DISCHARGE INSTRUCTIONS
Return for any worsening or new symptoms. Follow up with Primary Care Provider in the next 2-3 days.

## 2022-10-14 ENCOUNTER — OCCUPATIONAL HEALTH (OUTPATIENT)
Dept: URGENT CARE | Facility: CLINIC | Age: 27
End: 2022-10-14

## 2022-10-14 PROCEDURE — 80305 DRUG TEST PRSMV DIR OPT OBS: CPT | Mod: S$GLB,,, | Performed by: EMERGENCY MEDICINE

## 2022-10-14 PROCEDURE — 80305 PR COLLECTION ONLY DRUG SCREEN: ICD-10-PCS | Mod: S$GLB,,, | Performed by: EMERGENCY MEDICINE

## 2023-11-13 ENCOUNTER — OCCUPATIONAL HEALTH (OUTPATIENT)
Dept: URGENT CARE | Facility: CLINIC | Age: 28
End: 2023-11-13

## 2023-11-13 PROCEDURE — 80305 PR RAPID DRUG SCREEN, TEN PANEL, PRESUMPTIVE, DIRECT OBS: ICD-10-PCS | Mod: S$GLB,,, | Performed by: PHYSICIAN ASSISTANT

## 2023-11-13 PROCEDURE — 99499 UNLISTED E&M SERVICE: CPT | Mod: S$GLB,,, | Performed by: PHYSICIAN ASSISTANT

## 2023-11-13 PROCEDURE — 80305 DRUG TEST PRSMV DIR OPT OBS: CPT | Mod: S$GLB,,, | Performed by: PHYSICIAN ASSISTANT

## 2023-11-13 PROCEDURE — 99499 PHYSICAL - BASIC COMPLEXITY: ICD-10-PCS | Mod: S$GLB,,, | Performed by: PHYSICIAN ASSISTANT

## 2024-06-12 ENCOUNTER — HOSPITAL ENCOUNTER (EMERGENCY)
Facility: HOSPITAL | Age: 29
Discharge: HOME | End: 2024-06-12
Attending: STUDENT IN AN ORGANIZED HEALTH CARE EDUCATION/TRAINING PROGRAM

## 2024-06-12 VITALS
HEIGHT: 69 IN | RESPIRATION RATE: 20 BRPM | SYSTOLIC BLOOD PRESSURE: 112 MMHG | HEART RATE: 75 BPM | BODY MASS INDEX: 22.22 KG/M2 | DIASTOLIC BLOOD PRESSURE: 77 MMHG | WEIGHT: 150 LBS | TEMPERATURE: 99 F | OXYGEN SATURATION: 97 %

## 2024-06-12 DIAGNOSIS — H10.13 ALLERGIC CONJUNCTIVITIS OF BOTH EYES: Primary | ICD-10-CM

## 2024-06-12 PROCEDURE — 99284 EMERGENCY DEPT VISIT MOD MDM: CPT

## 2024-06-12 RX ORDER — AZELASTINE HYDROCHLORIDE 0.5 MG/ML
1 SOLUTION/ DROPS OPHTHALMIC 2 TIMES DAILY
Qty: 6 ML | Refills: 0 | Status: SHIPPED | OUTPATIENT
Start: 2024-06-12 | End: 2024-06-26

## 2024-06-12 RX ORDER — FEXOFENADINE HCL 60 MG
180 TABLET ORAL DAILY
Qty: 42 TABLET | Refills: 0 | Status: SHIPPED | OUTPATIENT
Start: 2024-06-12 | End: 2024-06-26

## 2024-06-12 NOTE — ED TRIAGE NOTES
"Present with c/o " my eyes"" I woke up this morning this eye was crusted over" pt reports symptoms since yesterday, reports worsening symptoms since today,     Reports worsening redness, reports swelling, " burning" sensation to bilat eye, denies blurred vision   "

## 2024-06-12 NOTE — DISCHARGE INSTRUCTIONS
If acute worsening of symptoms return to ER for evaluation    Follow-up with primary care in 24-48 hours for re-evaluation.

## 2024-06-12 NOTE — ED PROVIDER NOTES
Encounter Date: 6/12/2024       History     Chief Complaint   Patient presents with    Conjunctivitis     Itching, redness and discharge to bilateral eyes that started yesterday      Patient presents to the ER with complaint of redness and discharge to bilateral eyes.  The patient states that has got were states he keeps rubbing his eyes feeling like a scratchy denies any fever states [place] patient states children at home did not have similar symptoms the his significant other did have similar symptoms with a of resolved less than 24 hours.  Patient denied any other associated symptoms.    The history is provided by the patient.     Review of patient's allergies indicates:   Allergen Reactions    Sulfa (sulfonamide antibiotics)      Past Medical History:   Diagnosis Date    Anxiety      Past Surgical History:   Procedure Laterality Date    TONSILLECTOMY       No family history on file.  Social History     Tobacco Use    Smoking status: Never    Smokeless tobacco: Never   Substance Use Topics    Alcohol use: Yes     Alcohol/week: 7.0 standard drinks of alcohol     Types: 7 Cans of beer per week     Comment: 3-4 DAYS WEEKLY    Drug use: No     Review of Systems   Eyes:  Positive for discharge, redness and itching. Negative for photophobia, pain and visual disturbance.   Respiratory:  Negative for cough and shortness of breath.    Skin:  Negative for rash.   All other systems reviewed and are negative.      Physical Exam     Initial Vitals [06/12/24 1344]   BP Pulse Resp Temp SpO2   112/77 75 20 98.8 °F (37.1 °C) 97 %      MAP       --         Physical Exam    Nursing note and vitals reviewed.  Constitutional: He appears well-developed and well-nourished. He is not diaphoretic. No distress.   Eyes: Pupils are equal, round, and reactive to light. Lids are everted and swept, no foreign bodies found. Right eye exhibits discharge (thin clear). Right eye exhibits no  chemosis and no hordeolum. No foreign body present in the right eye. Left eye exhibits discharge (thin clear). Left eye exhibits no hordeolum. No foreign body present in the left eye. Right conjunctiva is injected. Right conjunctiva has no hemorrhage. Left conjunctiva is injected. Left conjunctiva has no hemorrhage. No scleral icterus. Right eye exhibits normal extraocular motion and no nystagmus. Left eye exhibits normal extraocular motion and no nystagmus.   Mild edema to bilateral eyelids within allergic shiner to the right side   Cardiovascular:  Normal rate, regular rhythm and intact distal pulses.           Pulmonary/Chest: No respiratory distress.   Musculoskeletal:         General: Normal range of motion.     Neurological: He is alert and oriented to person, place, and time.         ED Course   Procedures  Labs Reviewed - No data to display       Imaging Results    None          Medications - No data to display  Medical Decision Making    Discussed with the patient exam findings the patient's discharge is clear he has allergic shiner describes it as scratchy his exam is not consistent with bacterial conjunctivitis thus will treat for allergic conjunctivitis with the order medications.  I have discussed this with patient at length and there is no need for antibiotics at this time the patient verbalizes understanding have answered the patient's questions and the patient is stable for discharge with return precautions as I have discussed this patient.      This note was created using [employer] voice recognition software that occasionally misinterpreted phrases or words.    Problems Addressed:  Allergic conjunctivitis of both eyes: acute illness or injury    Risk  Prescription drug management.                                      Clinical Impression:  Final diagnoses:  [H10.13] Allergic conjunctivitis of both eyes (Primary)          ED Disposition Condition    Discharge Stable          ED Prescriptions        Medication Sig Dispense Start Date End Date Auth. Provider    azelastine (OPTIVAR) 0.05 % ophthalmic solution Place 1 drop into both eyes 2 (two) times daily. for 14 days 6 mL 6/12/2024 6/26/2024 Micah Chang PA    fexofenadine (ALLEGRA) 60 MG tablet Take 3 tablets (180 mg total) by mouth once daily. for 14 days 42 tablet 6/12/2024 6/26/2024 Micah Chang PA          Follow-up Information       Follow up With Specialties Details Why Contact [facility] Emergency Dept Emergency Medicine  If symptoms worsen [address] [facility] [address] [phone]    Your PCP  Schedule an appointment as soon as possible for a visit in 1 day               Micah Chang PA  06/12/24 4838

## 2025-01-08 ENCOUNTER — HOSPITAL ENCOUNTER (EMERGENCY)
Facility: HOSPITAL | Age: 30
Discharge: HOME | End: 2025-01-08
Attending: EMERGENCY MEDICINE

## 2025-01-08 VITALS
DIASTOLIC BLOOD PRESSURE: 74 MMHG | HEART RATE: 83 BPM | OXYGEN SATURATION: 97 % | SYSTOLIC BLOOD PRESSURE: 113 MMHG | RESPIRATION RATE: 18 BRPM | HEIGHT: 69 IN | BODY MASS INDEX: 20.73 KG/M2 | WEIGHT: 140 LBS | TEMPERATURE: 101 F

## 2025-01-08 DIAGNOSIS — J10.1 INFLUENZA A (H1N1): Primary | ICD-10-CM

## 2025-01-08 LAB
FLUAV AG UPPER RESP QL IA.RAPID: DETECTED
FLUBV AG UPPER RESP QL IA.RAPID: NOT DETECTED
RSV A 5' UTR RNA NPH QL NAA+PROBE: NOT DETECTED
SARS-COV-2 RNA RESP QL NAA+PROBE: NOT DETECTED

## 2025-01-08 PROCEDURE — 96372 THER/PROPH/DIAG INJ SC/IM: CPT | Performed by: EMERGENCY MEDICINE

## 2025-01-08 PROCEDURE — 63600175 PHARM REV CODE 636 W HCPCS: Performed by: EMERGENCY MEDICINE

## 2025-01-08 PROCEDURE — 0241U COVID/RSV/FLU A&B PCR: CPT | Performed by: EMERGENCY MEDICINE

## 2025-01-08 PROCEDURE — 99284 EMERGENCY DEPT VISIT MOD MDM: CPT | Mod: 25

## 2025-01-08 RX ORDER — KETOROLAC TROMETHAMINE 30 MG/ML
30 INJECTION, SOLUTION INTRAMUSCULAR; INTRAVENOUS
Status: COMPLETED | OUTPATIENT
Start: 2025-01-08 | End: 2025-01-08

## 2025-01-08 RX ORDER — PROMETHAZINE HYDROCHLORIDE AND DEXTROMETHORPHAN HYDROBROMIDE 6.25; 15 MG/5ML; MG/5ML
5 SYRUP ORAL EVERY 4 HOURS PRN
Qty: 120 ML | Refills: 0 | Status: SHIPPED | OUTPATIENT
Start: 2025-01-08 | End: 2025-01-18

## 2025-01-08 RX ORDER — OSELTAMIVIR PHOSPHATE 75 MG/1
75 CAPSULE ORAL 2 TIMES DAILY
Qty: 10 CAPSULE | Refills: 0 | Status: SHIPPED | OUTPATIENT
Start: 2025-01-08 | End: 2025-01-13

## 2025-01-08 RX ADMIN — KETOROLAC TROMETHAMINE 30 MG: 30 INJECTION, SOLUTION INTRAMUSCULAR; INTRAVENOUS at 02:01

## 2025-01-08 NOTE — Clinical Note
"Toni Cain" Mary was seen and treated in our emergency department on 1/8/2025.  He may return to work on 01/10/2025.       If you have any questions or concerns, please don't hesitate to call.      Vasquez Leonard MD"

## 2025-01-08 NOTE — ED PROVIDER NOTES
Encounter Date: 1/8/2025       History     Chief Complaint   Patient presents with    Headache     Headache with ear congestion x Sunday; has not taken any medication at all.     Patient presenting with a headache since Sunday evening followed by a cough fever and chills.    The history is provided by the patient.     Review of patient's allergies indicates:   Allergen Reactions    Sulfa (sulfonamide antibiotics)      Past Medical History:   Diagnosis Date    Anxiety      Past Surgical History:   Procedure Laterality Date    TONSILLECTOMY       No family history on file.  Social History     Tobacco Use    Smoking status: Never    Smokeless tobacco: Never   Substance Use Topics    Alcohol use: Not Currently     Alcohol/week: 7.0 standard drinks of alcohol     Types: 7 Cans of beer per week     Comment: 3-4 DAYS WEEKLY    Drug use: No     Review of Systems   Constitutional:  Positive for chills and fever.   HENT:  Positive for congestion and sinus pain.    Respiratory:  Positive for cough.    Neurological:  Positive for headaches.   All other systems reviewed and are negative.      Physical Exam     Initial Vitals [01/08/25 1330]   BP Pulse Resp Temp SpO2   110/74 95 18 (!) 100.6 °F (38.1 °C) 96 %      MAP       --         Physical Exam    Vitals reviewed.  Constitutional: He appears well-developed and well-nourished.   Eyes: EOM are normal. Pupils are equal, round, and reactive to light.   Neck:   Normal range of motion.  Cardiovascular:  Normal rate, regular rhythm and normal heart sounds.           Pulmonary/Chest: Breath sounds normal.   Abdominal: Abdomen is soft.   Musculoskeletal:         General: Normal range of motion.      Cervical back: Normal range of motion.     Skin: Skin is warm and dry.   Psychiatric: He has a normal mood and affect.         ED Course   Procedures  Labs Reviewed   COVID/RSV/FLU A&B PCR - Abnormal       Result Value    Influenza A PCR Detected (*)     Influenza B PCR Not Detected       Respiratory Syncytial Virus PCR Not Detected      SARS-CoV-2 PCR Not Detected      Narrative:     The Xpert Xpress SARS-CoV-2/FLU/RSV plus is a rapid, multiplexed real-time PCR test intended for the simultaneous qualitative detection and differentiation of SARS-CoV-2, Influenza A, Influenza B, and respiratory syncytial virus (RSV) viral RNA in either nasopharyngeal swab or nasal swab specimens.                Imaging Results              X-Ray Chest AP Portable (Final result)  Result time 01/08/25 14:54:14      Final result by Andry Bethea MD (01/08/25 14:54:14)                   Impression:      No acute findings.      Electronically signed by: Andry Bethea  Date:    01/08/2025  Time:    14:54               Narrative:    EXAMINATION:  XR CHEST AP PORTABLE    CLINICAL HISTORY:  Cough;    COMPARISON:  No priors    FINDINGS:  Frontal view of the chest was obtained. The heart is not enlarged.  Lungs are clear.  There is no pneumothorax or significant effusion.                                       Medications   ketorolac injection 30 mg (30 mg Intramuscular Given 1/8/25 1443)     Medical Decision Making  Amount and/or Complexity of Data Reviewed  Radiology: ordered.    Risk  Prescription drug management.                                      Clinical Impression:  Final diagnoses:  [J10.1] Influenza A (H1N1) (Primary)          ED Disposition Condition    Discharge Stable          ED Prescriptions       Medication Sig Dispense Start Date End Date Auth. Provider    oseltamivir (TAMIFLU) 75 MG capsule Take 1 capsule (75 mg total) by mouth 2 (two) times daily. for 5 days 10 capsule 1/8/2025 1/13/2025 Vasquez Leonard MD    promethazine-dextromethorphan (PROMETHAZINE-DM) 6.25-15 mg/5 mL Syrp Take 5 mLs by mouth every 4 (four) hours as needed. 120 mL 1/8/2025 1/18/2025 Vasquez Leonard MD          Follow-up Information       Follow up With Specialties Details Why Contact Info    [facility] - Emergency Dept Emergency  Medicine  As needed [address] [facility] [address]  [phone]             Vasquez Leonard MD  01/08/25 1531